# Patient Record
Sex: MALE | Race: WHITE | NOT HISPANIC OR LATINO | Employment: FULL TIME | URBAN - METROPOLITAN AREA
[De-identification: names, ages, dates, MRNs, and addresses within clinical notes are randomized per-mention and may not be internally consistent; named-entity substitution may affect disease eponyms.]

---

## 2018-03-19 ENCOUNTER — HOSPITAL ENCOUNTER (OUTPATIENT)
Facility: MEDICAL CENTER | Age: 53
End: 2018-03-19
Attending: NURSE PRACTITIONER
Payer: COMMERCIAL

## 2018-03-19 ENCOUNTER — OFFICE VISIT (OUTPATIENT)
Dept: URGENT CARE | Facility: PHYSICIAN GROUP | Age: 53
End: 2018-03-19
Payer: OTHER MISCELLANEOUS

## 2018-03-19 VITALS
WEIGHT: 238 LBS | HEIGHT: 71 IN | SYSTOLIC BLOOD PRESSURE: 138 MMHG | DIASTOLIC BLOOD PRESSURE: 88 MMHG | TEMPERATURE: 98.3 F | HEART RATE: 96 BPM | OXYGEN SATURATION: 97 % | BODY MASS INDEX: 33.32 KG/M2

## 2018-03-19 DIAGNOSIS — L08.9 INFECTED SEBACEOUS CYST OF SKIN: ICD-10-CM

## 2018-03-19 DIAGNOSIS — L72.3 INFECTED SEBACEOUS CYST OF SKIN: Primary | ICD-10-CM

## 2018-03-19 DIAGNOSIS — L08.9 INFECTED SEBACEOUS CYST OF SKIN: Primary | ICD-10-CM

## 2018-03-19 DIAGNOSIS — L72.3 INFECTED SEBACEOUS CYST OF SKIN: ICD-10-CM

## 2018-03-19 PROCEDURE — 87205 SMEAR GRAM STAIN: CPT

## 2018-03-19 PROCEDURE — 87070 CULTURE OTHR SPECIMN AEROBIC: CPT

## 2018-03-19 PROCEDURE — 10060 I&D ABSCESS SIMPLE/SINGLE: CPT | Performed by: NURSE PRACTITIONER

## 2018-03-19 PROCEDURE — 99000 SPECIMEN HANDLING OFFICE-LAB: CPT | Performed by: NURSE PRACTITIONER

## 2018-03-19 RX ORDER — SULFAMETHOXAZOLE AND TRIMETHOPRIM 800; 160 MG/1; MG/1
1 TABLET ORAL EVERY 12 HOURS
Qty: 20 TAB | Refills: 0 | Status: SHIPPED | OUTPATIENT
Start: 2018-03-19

## 2018-03-19 ASSESSMENT — ENCOUNTER SYMPTOMS
VOMITING: 0
FEVER: 0
HEADACHES: 0
CHILLS: 0
MYALGIAS: 0
NAUSEA: 0

## 2018-03-19 NOTE — PROGRESS NOTES
"Subjective:      Bruce Forbes is a 52 y.o. male who presents with Cyst (Possible cyst on chest)            Medications, Allergies and Prior Medical Hx reviewed and updated in [facility].with patient/family today     Patient states she still small cyst on his chest for approximately 2 years. But maybe 3-4 weeks ago he hit it twice since then has become red, inflamed and enlarged and tender. He denies any fevers or chills, but he states there has been some drainage.        Review of Systems   Constitutional: Negative for chills and fever.   Gastrointestinal: Negative for nausea and vomiting.   Musculoskeletal: Negative for myalgias.   Neurological: Negative for headaches.          Objective:     /88   Pulse 96   Temp 36.8 °C (98.3 °F)   Ht 1.803 m (5' 11\")   Wt 108 kg (238 lb)   SpO2 97%   BMI 33.19 kg/m²      Physical Exam   Constitutional: He appears well-developed and well-nourished. No distress.   HENT:   Head: Normocephalic and atraumatic.   Eyes: Conjunctivae are normal. Pupils are equal, round, and reactive to light.   Neck: Neck supple.   Cardiovascular: Normal rate.    Pulmonary/Chest: Effort normal. No respiratory distress.   Neurological: He is alert.   Awake, alert, answering questions appropriately, moving all extremeties   Skin: Skin is warm and dry. Capillary refill takes less than 2 seconds. No rash noted.        Psychiatric: He has a normal mood and affect. His behavior is normal.   Vitals reviewed.              Assessment/Plan:     1. Infected sebaceous cyst of skin  sulfamethoxazole-trimethoprim (BACTRIM DS) 800-160 MG tablet    CULTURE WOUND W/ GRAM STAIN         Procedure Note:   Area cleaned with betadine  Amnestied with lido 2% w/o  Wound opened with scalpel with moderate amt of purulent return,  Iirrigated with NS and explored for loculations  Culture Sent  Packed with 1/4 in gauze Dressing with gauze and tape  Pt tolerated well  Wound care discussed with pt/family    Rest, " elevation, ibuprofen  Pt will go to the ER for worsening or changing symptoms as discussed: increased redness, proximal streaking, high fevers/chills or any other concerns,   Follow-up with your primary care provider or return here 2 days for recheck  Discharge instructions discussed with pt/family who verbalize understanding and agreement with poc

## 2018-03-20 LAB
GRAM STN SPEC: NORMAL
SIGNIFICANT IND 70042: NORMAL
SITE SITE: NORMAL
SOURCE SOURCE: NORMAL

## 2018-03-21 ENCOUNTER — OFFICE VISIT (OUTPATIENT)
Dept: URGENT CARE | Facility: PHYSICIAN GROUP | Age: 53
End: 2018-03-21
Payer: OTHER MISCELLANEOUS

## 2018-03-21 VITALS
WEIGHT: 238 LBS | HEIGHT: 71 IN | SYSTOLIC BLOOD PRESSURE: 140 MMHG | TEMPERATURE: 97.3 F | HEART RATE: 88 BPM | OXYGEN SATURATION: 95 % | DIASTOLIC BLOOD PRESSURE: 86 MMHG | BODY MASS INDEX: 33.32 KG/M2

## 2018-03-21 DIAGNOSIS — L08.9 INFECTED SEBACEOUS CYST OF SKIN: ICD-10-CM

## 2018-03-21 DIAGNOSIS — L72.3 INFECTED SEBACEOUS CYST OF SKIN: ICD-10-CM

## 2018-03-21 DIAGNOSIS — Z48.00 DRESSING CHANGE: ICD-10-CM

## 2018-03-21 ASSESSMENT — ENCOUNTER SYMPTOMS
MYALGIAS: 0
HEADACHES: 0
CHILLS: 0
VOMITING: 0
FEVER: 0
NAUSEA: 0

## 2018-03-21 NOTE — PROGRESS NOTES
"Subjective:      Bruce Forbes is a 52 y.o. male who presents with Wound Check (Sebaceous cyst at mid chest )            Medications, Allergies and Prior Medical Hx reviewed and updated in [facility].with patient/family today           Wound Check   He was originally treated 2 to 3 days ago. Previous treatment included I&D of abscess. The maximum temperature noted was less than 100.4 F. There has been bloody and colored discharge from the wound. The redness has improved. The swelling has improved. The pain has improved. He has no difficulty moving the affected extremity or digit.       Review of Systems   Constitutional: Negative for chills and fever.   Gastrointestinal: Negative for nausea and vomiting.   Musculoskeletal: Negative for myalgias.   Neurological: Negative for headaches.          Objective:     /86   Pulse 88   Temp 36.3 °C (97.3 °F)   Ht 1.803 m (5' 11\")   Wt 108 kg (238 lb)   SpO2 95%   BMI 33.19 kg/m²      Physical Exam   Constitutional: He appears well-developed and well-nourished. No distress.   HENT:   Head: Normocephalic and atraumatic.   Eyes: Conjunctivae are normal. Pupils are equal, round, and reactive to light.   Neck: Neck supple.   Cardiovascular: Normal rate.    Pulmonary/Chest: Effort normal. No respiratory distress.   Neurological: He is alert.   Awake, alert, answering questions appropriately, moving all extremeties   Skin: Skin is warm and dry. No rash noted.        Psychiatric: He has a normal mood and affect. His behavior is normal.   Vitals reviewed.              Assessment/Plan:     1. Dressing change     2. Infected sebaceous cyst of skin           Procedure Note:   Packing removed w/o difficulty moderate purulent drainage  Packed with 1/4gauze Dressing with non stick dressing  Pt tolerated well     Continue medications as directed previously  Pt will go to the ER for worsening or changing symptoms as discussed, increased redness, swelling, fevers or chills, or " other concerns.   Follow-up with your primary care provider or return here in 2 days for recheck  Discharge instructions discussed with pt/family who verbalize understanding and agreement with poc

## 2018-03-22 LAB
BACTERIA WND AEROBE CULT: NORMAL
GRAM STN SPEC: NORMAL
SIGNIFICANT IND 70042: NORMAL
SITE SITE: NORMAL
SOURCE SOURCE: NORMAL